# Patient Record
Sex: MALE | Race: OTHER | NOT HISPANIC OR LATINO
[De-identification: names, ages, dates, MRNs, and addresses within clinical notes are randomized per-mention and may not be internally consistent; named-entity substitution may affect disease eponyms.]

---

## 2020-05-13 ENCOUNTER — APPOINTMENT (OUTPATIENT)
Dept: GERIATRICS | Facility: CLINIC | Age: 60
End: 2020-05-13

## 2020-05-13 ENCOUNTER — APPOINTMENT (OUTPATIENT)
Dept: INTERNAL MEDICINE | Facility: CLINIC | Age: 60
End: 2020-05-13

## 2020-05-19 ENCOUNTER — APPOINTMENT (OUTPATIENT)
Dept: INTERNAL MEDICINE | Facility: CLINIC | Age: 60
End: 2020-05-19
Payer: COMMERCIAL

## 2020-05-19 VITALS — HEIGHT: 62 IN | WEIGHT: 177 LBS | BODY MASS INDEX: 32.57 KG/M2 | RESPIRATION RATE: 16 BRPM

## 2020-05-19 DIAGNOSIS — Z83.3 FAMILY HISTORY OF DIABETES MELLITUS: ICD-10-CM

## 2020-05-19 DIAGNOSIS — Z60.2 PROBLEMS RELATED TO LIVING ALONE: ICD-10-CM

## 2020-05-19 DIAGNOSIS — F17.200 NICOTINE DEPENDENCE, UNSPECIFIED, UNCOMPLICATED: ICD-10-CM

## 2020-05-19 DIAGNOSIS — Z82.61 FAMILY HISTORY OF ARTHRITIS: ICD-10-CM

## 2020-05-19 PROBLEM — Z11.4 SCREENING FOR HIV (HUMAN IMMUNODEFICIENCY VIRUS): Status: ACTIVE | Noted: 2020-05-19

## 2020-05-19 PROBLEM — Z20.828 CLOSE EXPOSURE TO COVID-19 VIRUS: Status: ACTIVE | Noted: 2020-05-19

## 2020-05-19 PROBLEM — Z11.59 NEED FOR HEPATITIS C SCREENING TEST: Status: ACTIVE | Noted: 2020-05-19

## 2020-05-19 PROCEDURE — 99406 BEHAV CHNG SMOKING 3-10 MIN: CPT | Mod: 95

## 2020-05-19 PROCEDURE — G0444 DEPRESSION SCREEN ANNUAL: CPT | Mod: 95

## 2020-05-19 PROCEDURE — 99205 OFFICE O/P NEW HI 60 MIN: CPT | Mod: 25,95

## 2020-05-19 SDOH — SOCIAL STABILITY - SOCIAL INSECURITY: PROBLEMS RELATED TO LIVING ALONE: Z60.2

## 2020-05-22 ENCOUNTER — APPOINTMENT (OUTPATIENT)
Dept: INTERNAL MEDICINE | Facility: CLINIC | Age: 60
End: 2020-05-22
Payer: COMMERCIAL

## 2020-05-22 ENCOUNTER — TRANSCRIPTION ENCOUNTER (OUTPATIENT)
Age: 60
End: 2020-05-22

## 2020-05-22 VITALS
TEMPERATURE: 97.9 F | HEART RATE: 64 BPM | WEIGHT: 175 LBS | SYSTOLIC BLOOD PRESSURE: 135 MMHG | HEIGHT: 62 IN | DIASTOLIC BLOOD PRESSURE: 84 MMHG | BODY MASS INDEX: 32.2 KG/M2 | OXYGEN SATURATION: 98 %

## 2020-05-22 DIAGNOSIS — Z23 ENCOUNTER FOR IMMUNIZATION: ICD-10-CM

## 2020-05-22 DIAGNOSIS — Z20.828 CONTACT WITH AND (SUSPECTED) EXPOSURE TO OTHER VIRAL COMMUNICABLE DISEASES: ICD-10-CM

## 2020-05-22 DIAGNOSIS — Z11.59 ENCOUNTER FOR SCREENING FOR OTHER VIRAL DISEASES: ICD-10-CM

## 2020-05-22 DIAGNOSIS — Z11.4 ENCOUNTER FOR SCREENING FOR HUMAN IMMUNODEFICIENCY VIRUS [HIV]: ICD-10-CM

## 2020-05-22 PROCEDURE — 99214 OFFICE O/P EST MOD 30 MIN: CPT | Mod: 25

## 2020-05-22 PROCEDURE — 90472 IMMUNIZATION ADMIN EACH ADD: CPT

## 2020-05-22 PROCEDURE — G0009: CPT

## 2020-05-22 PROCEDURE — 90732 PPSV23 VACC 2 YRS+ SUBQ/IM: CPT

## 2020-05-22 PROCEDURE — 90715 TDAP VACCINE 7 YRS/> IM: CPT

## 2020-05-22 PROCEDURE — 36415 COLL VENOUS BLD VENIPUNCTURE: CPT

## 2020-05-26 LAB
25(OH)D3 SERPL-MCNC: 15.3 NG/ML
ALBUMIN SERPL ELPH-MCNC: 4.2 G/DL
ALP BLD-CCNC: 85 U/L
ALT SERPL-CCNC: 19 U/L
ANACR T: NEGATIVE
ANION GAP SERPL CALC-SCNC: 15 MMOL/L
AST SERPL-CCNC: 22 U/L
BASOPHILS # BLD AUTO: 0.08 K/UL
BASOPHILS NFR BLD AUTO: 1.1 %
BILIRUB SERPL-MCNC: 0.4 MG/DL
BUN SERPL-MCNC: 15 MG/DL
CALCIUM SERPL-MCNC: 9.3 MG/DL
CCP AB SER IA-ACNC: <8 UNITS
CHLORIDE SERPL-SCNC: 104 MMOL/L
CHOLEST SERPL-MCNC: 149 MG/DL
CHOLEST/HDLC SERPL: 4.2 RATIO
CK SERPL-CCNC: 216 U/L
CO2 SERPL-SCNC: 21 MMOL/L
CREAT SERPL-MCNC: 0.97 MG/DL
CRP SERPL-MCNC: 1.67 MG/DL
EOSINOPHIL # BLD AUTO: 0.22 K/UL
EOSINOPHIL NFR BLD AUTO: 3 %
ERYTHROCYTE [SEDIMENTATION RATE] IN BLOOD BY WESTERGREN METHOD: 33 MM/HR
ESTIMATED AVERAGE GLUCOSE: 117 MG/DL
GLUCOSE SERPL-MCNC: 105 MG/DL
HBA1C MFR BLD HPLC: 5.7 %
HCT VFR BLD CALC: 43 %
HCV AB SER QL: NONREACTIVE
HCV S/CO RATIO: 0.18 S/CO
HDLC SERPL-MCNC: 35 MG/DL
HGB BLD-MCNC: 13 G/DL
HIV1+2 AB SPEC QL IA.RAPID: NONREACTIVE
IMM GRANULOCYTES NFR BLD AUTO: 0.7 %
LDLC SERPL CALC-MCNC: 92 MG/DL
LYMPHOCYTES # BLD AUTO: 1.5 K/UL
LYMPHOCYTES NFR BLD AUTO: 20.2 %
MAN DIFF?: NORMAL
MCHC RBC-ENTMCNC: 27.1 PG
MCHC RBC-ENTMCNC: 30.2 GM/DL
MCV RBC AUTO: 89.8 FL
MONOCYTES # BLD AUTO: 0.52 K/UL
MONOCYTES NFR BLD AUTO: 7 %
NEUTROPHILS # BLD AUTO: 5.05 K/UL
NEUTROPHILS NFR BLD AUTO: 68 %
PLATELET # BLD AUTO: 237 K/UL
POTASSIUM SERPL-SCNC: 4.3 MMOL/L
PROT SERPL-MCNC: 7 G/DL
RBC # BLD: 4.79 M/UL
RBC # FLD: 13.5 %
RF+CCP IGG SER-IMP: NEGATIVE
RHEUMATOID FACT SER QL: <10 IU/ML
SARS-COV-2 IGG SERPL IA-ACNC: 6.18 INDEX
SARS-COV-2 IGG SERPL QL IA: POSITIVE
SODIUM SERPL-SCNC: 140 MMOL/L
T4 FREE SERPL-MCNC: 0.9 NG/DL
TRIGL SERPL-MCNC: 107 MG/DL
TSH SERPL-ACNC: 3.03 UIU/ML
VIT B12 SERPL-MCNC: 629 PG/ML
WBC # FLD AUTO: 7.42 K/UL

## 2020-06-05 ENCOUNTER — RESULT REVIEW (OUTPATIENT)
Age: 60
End: 2020-06-05

## 2020-06-05 ENCOUNTER — APPOINTMENT (OUTPATIENT)
Dept: RADIOLOGY | Facility: CLINIC | Age: 60
End: 2020-06-05

## 2020-06-05 ENCOUNTER — LABORATORY RESULT (OUTPATIENT)
Age: 60
End: 2020-06-05

## 2020-06-05 ENCOUNTER — APPOINTMENT (OUTPATIENT)
Dept: RHEUMATOLOGY | Facility: CLINIC | Age: 60
End: 2020-06-05
Payer: COMMERCIAL

## 2020-06-05 ENCOUNTER — OUTPATIENT (OUTPATIENT)
Dept: OUTPATIENT SERVICES | Facility: HOSPITAL | Age: 60
LOS: 1 days | End: 2020-06-05
Payer: COMMERCIAL

## 2020-06-05 VITALS
WEIGHT: 167.56 LBS | HEIGHT: 62 IN | TEMPERATURE: 98.3 F | BODY MASS INDEX: 30.83 KG/M2 | HEART RATE: 70 BPM | SYSTOLIC BLOOD PRESSURE: 133 MMHG | DIASTOLIC BLOOD PRESSURE: 80 MMHG | OXYGEN SATURATION: 96 %

## 2020-06-05 DIAGNOSIS — Z00.00 ENCOUNTER FOR GENERAL ADULT MEDICAL EXAMINATION W/OUT ABNORMAL FINDINGS: ICD-10-CM

## 2020-06-05 DIAGNOSIS — Z12.83 ENCOUNTER FOR SCREENING FOR MALIGNANT NEOPLASM OF SKIN: ICD-10-CM

## 2020-06-05 DIAGNOSIS — M25.552 PAIN IN RIGHT HIP: ICD-10-CM

## 2020-06-05 DIAGNOSIS — R35.0 FREQUENCY OF MICTURITION: ICD-10-CM

## 2020-06-05 DIAGNOSIS — M25.551 PAIN IN RIGHT HIP: ICD-10-CM

## 2020-06-05 DIAGNOSIS — R63.4 ABNORMAL WEIGHT LOSS: ICD-10-CM

## 2020-06-05 PROCEDURE — 99205 OFFICE O/P NEW HI 60 MIN: CPT | Mod: 25

## 2020-06-05 PROCEDURE — 72170 X-RAY EXAM OF PELVIS: CPT | Mod: 26

## 2020-06-05 PROCEDURE — 73030 X-RAY EXAM OF SHOULDER: CPT | Mod: 26,50

## 2020-06-05 PROCEDURE — 36415 COLL VENOUS BLD VENIPUNCTURE: CPT

## 2020-06-05 NOTE — PHYSICAL EXAM
[General Appearance - Alert] : alert [General Appearance - In No Acute Distress] : in no acute distress [General Appearance - Well Nourished] : well nourished [General Appearance - Well Developed] : well developed [Sclera] : the sclera and conjunctiva were normal [Outer Ear] : the ears and nose were normal in appearance [Neck Appearance] : the appearance of the neck was normal [Neck Cervical Mass (___cm)] : no neck mass was observed [Respiration, Rhythm And Depth] : normal respiratory rhythm and effort [Auscultation Breath Sounds / Voice Sounds] : lungs were clear to auscultation bilaterally [Heart Rate And Rhythm] : heart rate was normal and rhythm regular [Heart Sounds] : normal S1 and S2 [Murmurs] : no murmurs [Heart Sounds Pericardial Friction Rub] : no pericardial rub [Edema] : there was no peripheral edema [Veins - Varicosity Changes] : there were no varicosital changes [Abdomen Tenderness] : non-tender [Cervical Lymph Nodes Enlarged Posterior Bilaterally] : posterior cervical [Cervical Lymph Nodes Enlarged Anterior Bilaterally] : anterior cervical [Supraclavicular Lymph Nodes Enlarged Bilaterally] : supraclavicular [Axillary Lymph Nodes Enlarged Bilaterally] : axillary [No Spinal Tenderness] : no spinal tenderness [Abnormal Walk] : normal gait [Nail Clubbing] : no clubbing  or cyanosis of the fingernails [Musculoskeletal - Swelling] : no joint swelling seen [Motor Tone] : muscle strength and tone were normal [] : no rash [Skin Lesions] : no skin lesions [Sensation] : the sensory exam was normal to light touch and pinprick [Motor Exam] : the motor exam was normal [Oriented To Time, Place, And Person] : oriented to person, place, and time [Impaired Insight] : insight and judgment were intact [Affect] : the affect was normal [FreeTextEntry1] : back with few moles

## 2020-06-05 NOTE — HISTORY OF PRESENT ILLNESS
[Weight Loss] : weight loss [Arthralgias] : arthralgias [Morning Stiffness] : morning stiffness [Difficulty Standing] : difficulty standing [Difficulty Walking] : difficulty walking [Myalgias] : myalgias [Muscle Weakness] : muscle weakness [Muscle Spasms] : muscle spasms [Muscle Cramping] : muscle cramping [FreeTextEntry1] : Referred by  for Rheumatology consultation \par \par \par 58 y/o cigar smoker 2 times per week ( 3-4 cigars) , pre DM, Vit D deficiency. \par Since early feb reports progressive  b/l groin and hip pain, first noticed on left side and then affecting R side. Subsequently left> right shoulder pain, neck stiffness \par AM stiffness for hours, symptoms improve as day goes on.  \par lifting arms above head has been difficult, has noticed trouble taking stairs. \par Has muscle ache and cramps. \par Denies constitutional symptoms such as fever, chills or night sweats. Denies HA or jaw claudication, or transient visual loss. \par Since pandemic lost weight after changing to healthy diet. 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 \par He took tylenol and Ibuprofen for pain control, which  has helped. \par Referred to r/u PMR. \par \par No h/o  memory loss, patchy hair loss, sicca symptoms, photosensitivity, HA,  Raynaud's, oral ulcers, nasal ulcers. \par Personal or family hx of autoimmune disease, no hx of psoriasis\par  [Anorexia] : no anorexia [Malaise] : no malaise [Fever] : no fever [Chills] : no chills [Fatigue] : no fatigue [Depression] : no depression [Malar Facial Rash] : no malar facial rash [Skin Lesions] : no lesions [Skin Nodules] : no skin nodules [Oral Ulcers] : no oral ulcers [Cough] : no cough [Dry Mouth] : no dry mouth [Dysphonia] : no dysphonia [Dysphagia] : no dysphagia [Shortness of Breath] : no shortness of breath [Chest Pain] : no chest pain [Joint Swelling] : no joint swelling [Joint Deformity] : no joint deformity [Decreased ROM] : no decreased range of motion [Falls] : no falls [Dyspnea] : no dyspnea [Visual Changes] : no visual changes [Eye Pain] : no eye pain [Eye Redness] : no eye redness [Dry Eyes] : no dry eyes

## 2020-06-05 NOTE — REVIEW OF SYSTEMS
[Recent Weight Loss (___ Lbs)] : recent [unfilled] ~Ulb weight loss [Feeling Tired] : feeling tired [Muscle Weakness] : muscle weakness [Fever] : no fever [Chills] : no chills [Feeling Poorly] : not feeling poorly [Eyesight Problems] : no eyesight problems [Dry Eyes] : no dryness of the eyes [Heart Rate Is Slow] : the heart rate was not slow [Chest Pain] : no chest pain [Palpitations] : no palpitations [Cough] : no cough [SOB on Exertion] : no shortness of breath during exertion [Constipation] : no constipation [Diarrhea] : no diarrhea [Anxiety] : no anxiety [Depression] : no depression [Easy Bleeding] : no tendency for easy bleeding [Easy Bruising] : no tendency for easy bruising [Swollen Glands] : no swollen glands [Swollen Glands In The Neck] : no swollen glands in the neck

## 2020-06-05 NOTE — ASSESSMENT
[FreeTextEntry1] : 60 y/o M with gradual onset first proximal lower extremity pain /muscle ache and lately upper extremity/shoulder symptoms with hours of AM stiffness, mildly elevated inflammatory markers ESR, CRP and . \par DDx of his presentation is likely PMR vs OA, I have low suspicious for inflammatory myositis ( has no other features such as typical  skin rash, proximal muscle weakness ) \par \par 1. Likely PMR: start prednisone trial 20mg for 2 weeks and monitor response \par Recommended to avoid NSAIDs while on prednisone to prevent synergistic side effects such as risk of GI bleeding. \par \par Corticosteroid side effect discussed: predisposition to infection, resulting in a dose-dependent increase in the risk of infection, especially with common bacterial, viral, and fungal pathogens\par Cushingoid features, weight gain \par Increased intraocular pressure and cataract \par Cardiovascular effects: hypertension and fluid retention, premature atherosclerosis.\par Gastrointestinal effects, such as gastritis, ulcer formation, and gastrointestinal bleeding. \par Bone and muscle effects: Osteoporosis, myopathy, osteonecrosis. \par Mood and sleep problems \par \par \par Obtain b/l hip and shoulder Xrays to rule out inflammatory vs DJD \par \par 2. Mild CK elevation: repeat CK, myositis specific labs and HMGCR\par 3. Age appropriate cancer screening: need skin check and colonoscopy, reports intentional weight loss \par will check PSA as pt iis chronic smoker and at risk for malignancy \par check SPEP/UPEP with IF. \par UA \par \par 60 Minutes face to face  encounter \par 50% of time spend on counselling and/or coordinating of care\par \par \par  \par \par \par \par \par

## 2020-06-09 LAB
ALBUMIN MFR SERPL ELPH: 53.2 %
ALBUMIN SERPL-MCNC: 4.4 G/DL
ALBUMIN/GLOB SERPL: 1.2 RATIO
ALDOLASE SERPL-CCNC: 5 U/L
ALPHA1 GLOB MFR SERPL ELPH: 4.9 %
ALPHA1 GLOB SERPL ELPH-MCNC: 0.4 G/DL
ALPHA2 GLOB MFR SERPL ELPH: 8.8 %
ALPHA2 GLOB SERPL ELPH-MCNC: 0.7 G/DL
APPEARANCE: CLEAR
APPEARANCE: CLEAR
B-GLOBULIN MFR SERPL ELPH: 12 %
B-GLOBULIN SERPL ELPH-MCNC: 1 G/DL
BACTERIA: NEGATIVE
BILIRUBIN URINE: NEGATIVE
BILIRUBIN URINE: NEGATIVE
BLOOD URINE: NEGATIVE
BLOOD URINE: NEGATIVE
CK SERPL-CCNC: 224 U/L
COLOR: NORMAL
COLOR: NORMAL
DEPRECATED KAPPA LC FREE/LAMBDA SER: 1.33 RATIO
GAMMA GLOB FLD ELPH-MCNC: 1.7 G/DL
GAMMA GLOB MFR SERPL ELPH: 21.1 %
GLUCOSE QUALITATIVE U: NEGATIVE
GLUCOSE QUALITATIVE U: NEGATIVE
HYALINE CASTS: 1 /LPF
IGA SER QL IEP: 333 MG/DL
IGG SER QL IEP: 1810 MG/DL
IGM SER QL IEP: 64 MG/DL
INTERPRETATION SERPL IEP-IMP: NORMAL
KAPPA LC CSF-MCNC: 1.49 MG/DL
KAPPA LC SERPL-MCNC: 1.98 MG/DL
KETONES URINE: NEGATIVE
KETONES URINE: NEGATIVE
LEUKOCYTE ESTERASE URINE: NEGATIVE
LEUKOCYTE ESTERASE URINE: NEGATIVE
M PROTEIN SPEC IFE-MCNC: NORMAL
MICROSCOPIC-UA: NORMAL
NITRITE URINE: NEGATIVE
NITRITE URINE: NEGATIVE
PH URINE: 6.5
PH URINE: 6.5
PROT SERPL-MCNC: 8.2 G/DL
PROT SERPL-MCNC: 8.2 G/DL
PROTEIN URINE: NORMAL
PROTEIN URINE: NORMAL
PSA SERPL-MCNC: 0.87 NG/ML
RED BLOOD CELLS URINE: 1 /HPF
SPECIFIC GRAVITY URINE: 1.02
SPECIFIC GRAVITY URINE: 1.02
SQUAMOUS EPITHELIAL CELLS: 1 /HPF
UROBILINOGEN URINE: NORMAL
UROBILINOGEN URINE: NORMAL
WHITE BLOOD CELLS URINE: 1 /HPF

## 2020-06-10 LAB
ALBUPE: 18.3 %
ALPHA1UPE: 37.1 %
ALPHA2UPE: 20.3 %
BETAUPE: 11.6 %
GAMMAUPE: 12.7 %
IGA 24H UR QL IFE: NORMAL
KAPPA LC 24H UR QL: NORMAL
PROT PATTERN 24H UR ELPH-IMP: NORMAL
PROT UR-MCNC: 11 MG/DL
PROT UR-MCNC: 11 MG/DL

## 2020-06-12 LAB — HMGCR ANTIBODY, IGG: <3 UNITS

## 2020-06-19 ENCOUNTER — APPOINTMENT (OUTPATIENT)
Dept: RADIOLOGY | Facility: CLINIC | Age: 60
End: 2020-06-19
Payer: COMMERCIAL

## 2020-06-19 ENCOUNTER — OUTPATIENT (OUTPATIENT)
Dept: OUTPATIENT SERVICES | Facility: HOSPITAL | Age: 60
LOS: 1 days | End: 2020-06-19

## 2020-06-19 ENCOUNTER — APPOINTMENT (OUTPATIENT)
Dept: RHEUMATOLOGY | Facility: CLINIC | Age: 60
End: 2020-06-19
Payer: COMMERCIAL

## 2020-06-19 VITALS
HEART RATE: 65 BPM | SYSTOLIC BLOOD PRESSURE: 135 MMHG | HEIGHT: 62 IN | DIASTOLIC BLOOD PRESSURE: 77 MMHG | OXYGEN SATURATION: 97 % | TEMPERATURE: 98.2 F | WEIGHT: 170 LBS | BODY MASS INDEX: 31.28 KG/M2

## 2020-06-19 DIAGNOSIS — S22.000A WEDGE COMPRESSION FRACTURE OF UNSPECIFIED THORACIC VERTEBRA, INITIAL ENCOUNTER FOR CLOSED FRACTURE: ICD-10-CM

## 2020-06-19 DIAGNOSIS — R74.8 ABNORMAL LEVELS OF OTHER SERUM ENZYMES: ICD-10-CM

## 2020-06-19 PROCEDURE — 99214 OFFICE O/P EST MOD 30 MIN: CPT | Mod: 25

## 2020-06-19 PROCEDURE — 77085 DXA BONE DENSITY AXL VRT FX: CPT | Mod: 26

## 2020-06-19 PROCEDURE — 36415 COLL VENOUS BLD VENIPUNCTURE: CPT

## 2020-06-19 NOTE — PHYSICAL EXAM
[General Appearance - In No Acute Distress] : in no acute distress [General Appearance - Alert] : alert [General Appearance - Well Developed] : well developed [General Appearance - Well Nourished] : well nourished [Sclera] : the sclera and conjunctiva were normal [Auscultation Breath Sounds / Voice Sounds] : lungs were clear to auscultation bilaterally [Respiration, Rhythm And Depth] : normal respiratory rhythm and effort [Heart Rate And Rhythm] : heart rate was normal and rhythm regular [Heart Sounds Pericardial Friction Rub] : no pericardial rub [Heart Sounds] : normal S1 and S2 [Murmurs] : no murmurs [Edema] : there was no peripheral edema [Veins - Varicosity Changes] : there were no varicosital changes [Cervical Lymph Nodes Enlarged Posterior Bilaterally] : posterior cervical [Abdomen Tenderness] : non-tender [Cervical Lymph Nodes Enlarged Anterior Bilaterally] : anterior cervical [Supraclavicular Lymph Nodes Enlarged Bilaterally] : supraclavicular [Axillary Lymph Nodes Enlarged Bilaterally] : axillary [Musculoskeletal - Swelling] : no joint swelling seen [Nail Clubbing] : no clubbing  or cyanosis of the fingernails [Abnormal Walk] : normal gait [Motor Tone] : muscle strength and tone were normal [] : no rash [Skin Lesions] : no skin lesions [Oriented To Time, Place, And Person] : oriented to person, place, and time [Impaired Insight] : insight and judgment were intact [Affect] : the affect was normal [FreeTextEntry1] : back with few moles

## 2020-06-19 NOTE — ASSESSMENT
[FreeTextEntry1] : 60 y/o M with gradual onset first proximal lower extremity pain /muscle ache and lately upper extremity/shoulder symptoms with hours of AM stiffness, mildly elevated inflammatory markers ESR, CRP and . \par Diagnosed PMR and started prednisone 20mg with great clinica response. 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 Productive changes in the spine with mid thoracic compression deformity of indeterminate age. \par Cam morphology of the femora. \par  I have low suspicious for inflammatory myositis ( has no other features such as typical  skin rash, proximal muscle weakness ) and has myositis specific labs all negative \par \par \par 1. PMR:  excellent clinical response to  20mg of prednisone for 2 weeks, recommended to complete 3 weeks of curent regimen and the she can taper down to 15mg X3 weeks and then 12.5mg X3 weeks \par Check ESR and CRP today \par \par Corticosteroid side effect discussed: predisposition to infection, resulting in a dose-dependent increase in the risk of infection, especially with common bacterial, viral, and fungal pathogens\par Cushingoid features, weight gain \par Increased intraocular pressure and cataract \par Cardiovascular effects: hypertension and fluid retention, premature atherosclerosis.\par Gastrointestinal effects, such as gastritis, ulcer formation, and gastrointestinal bleeding. \par Bone and muscle effects: Osteoporosis, myopathy, osteonecrosis. 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 Mild CK elevation:non specific  myositis specific labs and HMGCR, pt is very active and exercise regularly. \par 3. Age appropriate cancer screening: need skin check and colonoscopy, has normal PSA, SPEP/UPEP\par 4. Thoracic compression deformity:  incidentally found, will obtain DXA and OP labs \par Bone health: \par c/w calcium and Vit D\par \par f/u in 6 weeks \par \par 25  Minutes face to face  encounter \par 50% of time spend on counselling and/or coordinating of care\par \par \par  \par \par \par \par \par

## 2020-06-19 NOTE — REVIEW OF SYSTEMS
[Recent Weight Loss (___ Lbs)] : recent [unfilled] ~Ulb weight loss [Muscle Weakness] : muscle weakness [Fever] : no fever [Chills] : no chills [Feeling Poorly] : not feeling poorly [Feeling Tired] : not feeling tired [Heart Rate Is Slow] : the heart rate was not slow [Dry Eyes] : no dryness of the eyes [Eyesight Problems] : no eyesight problems [Chest Pain] : no chest pain [Palpitations] : no palpitations [Cough] : no cough [SOB on Exertion] : no shortness of breath during exertion [Constipation] : no constipation [Diarrhea] : no diarrhea [Anxiety] : no anxiety [Depression] : no depression [Easy Bruising] : no tendency for easy bruising [Easy Bleeding] : no tendency for easy bleeding [Swollen Glands] : no swollen glands [Swollen Glands In The Neck] : no swollen glands in the neck

## 2020-06-19 NOTE — HISTORY OF PRESENT ILLNESS
[Weight Loss] : weight loss [___ Week(s) Ago] : [unfilled] week(s) ago [FreeTextEntry1] : Follow up: 6/19/20 \par PMR: on prednisone 20mg for past 2 weeks, has excellent response. All symptoms resolved. He is back to baseline. \par Regained 4Ibs \par Labs and Xrays reviewed with pt. \par Noted th spine compression deformity \par \par \par \par Referred by  for Rheumatology consultation \par \par \par 58 y/o cigar smoker 2 times per week ( 3-4 cigars) , pre DM, Vit D deficiency. \par Since early feb reports progressive  b/l groin and hip pain, first noticed on left side and then affecting R side. Subsequently left> right shoulder pain, neck stiffness \par AM stiffness for hours, symptoms improve as day goes on.  \par lifting arms above head has been difficult, has noticed trouble taking stairs. \par Has muscle ache and cramps. \par Denies constitutional symptoms such as fever, chills or night sweats. Denies HA or jaw claudication, or transient visual loss. \par Since pandemic lost weight after changing to healthy diet. 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 \par He took tylenol and Ibuprofen for pain control, which  has helped. \par Referred to r/u PMR. \par \par No h/o  memory loss, patchy hair loss, sicca symptoms, photosensitivity, HA,  Raynaud's, oral ulcers, nasal ulcers. \par Personal or family hx of autoimmune disease, no hx of psoriasis\par  [Anorexia] : no anorexia [Malaise] : no malaise [Fever] : no fever [Depression] : no depression [Chills] : no chills [Fatigue] : no fatigue [Malar Facial Rash] : no malar facial rash [Skin Lesions] : no lesions [Oral Ulcers] : no oral ulcers [Skin Nodules] : no skin nodules [Cough] : no cough [Dry Mouth] : no dry mouth [Shortness of Breath] : no shortness of breath [Dysphonia] : no dysphonia [Dysphagia] : no dysphagia [Arthralgias] : no arthralgias [Chest Pain] : no chest pain [Joint Deformity] : no joint deformity [Joint Swelling] : no joint swelling [Decreased ROM] : no decreased range of motion [Morning Stiffness] : no morning stiffness [Falls] : no falls [Difficulty Walking] : no difficulty walking [Difficulty Standing] : no difficulty standing [Dyspnea] : no dyspnea [Myalgias] : no myalgias [Muscle Spasms] : no muscle spasms [Muscle Weakness] : no muscle weakness [Muscle Cramping] : no muscle cramping [Visual Changes] : no visual changes [Eye Pain] : no eye pain [Eye Redness] : no eye redness [Dry Eyes] : no dry eyes

## 2020-06-20 LAB
CALCIUM SERPL-MCNC: 9.8 MG/DL
CRP SERPL-MCNC: 0.13 MG/DL
ERYTHROCYTE [SEDIMENTATION RATE] IN BLOOD BY WESTERGREN METHOD: 12 MM/HR
MAGNESIUM SERPL-MCNC: 2.2 MG/DL
PARATHYROID HORMONE INTACT: 29 PG/ML
PHOSPHATE SERPL-MCNC: 3.1 MG/DL

## 2020-06-25 ENCOUNTER — TRANSCRIPTION ENCOUNTER (OUTPATIENT)
Age: 60
End: 2020-06-25

## 2020-06-26 LAB
TESTOST BND SERPL-MCNC: 2.7 PG/ML
TESTOST SERPL-MCNC: 218.1 NG/DL

## 2020-06-28 ENCOUNTER — RX RENEWAL (OUTPATIENT)
Age: 60
End: 2020-06-28

## 2020-07-14 LAB — ENA JO1 AB SER IA-ACNC: NORMAL

## 2020-07-31 ENCOUNTER — APPOINTMENT (OUTPATIENT)
Dept: RHEUMATOLOGY | Facility: CLINIC | Age: 60
End: 2020-07-31
Payer: COMMERCIAL

## 2020-07-31 VITALS
DIASTOLIC BLOOD PRESSURE: 82 MMHG | HEART RATE: 65 BPM | SYSTOLIC BLOOD PRESSURE: 142 MMHG | TEMPERATURE: 98.2 F | WEIGHT: 171 LBS | HEIGHT: 62 IN | BODY MASS INDEX: 31.47 KG/M2 | OXYGEN SATURATION: 96 %

## 2020-07-31 DIAGNOSIS — R35.0 FREQUENCY OF MICTURITION: ICD-10-CM

## 2020-07-31 DIAGNOSIS — M19.019 PRIMARY OSTEOARTHRITIS, UNSPECIFIED SHOULDER: ICD-10-CM

## 2020-07-31 PROCEDURE — 99214 OFFICE O/P EST MOD 30 MIN: CPT

## 2020-07-31 NOTE — ASSESSMENT
[FreeTextEntry1] : 58 y/o M with gradual onset first proximal lower extremity pain /muscle ache and lately upper extremity/shoulder symptoms with hours of AM stiffness, mildly elevated inflammatory markers ESR, CRP and . \par Diagnosed PMR and started prednisone 20mg with great clinical response. 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 Productive changes in the spine with mid thoracic compression deformity of indeterminate age. \par Cam morphology of the femora. \par \par \par 1. PMR:  excellent clinical response to  20mg of prednisone, subsequently tapered down and currently takes  12.5mg X2 weeks, instructed c/w 12.5mg for X3 weeks, then taper down to 10mg X3 weeks ( end of August) and subsequently if stable symptoms can go to 7.5mg until next follow up.  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 Mild CK elevation:non specific  myositis specific labs and HMGCR, pt is very active and exercise regularly. \par 3. Age appropriate cancer screening: need skin check and colonoscopy, has normal PSA, SPEP/UPEP\par 4. DXA, osteopenia,  Thoracic compression deformity without fracture, \par we talked about bone health \par Calcium 1200 mg daily from diet and supplements (to be taken in divided doses as no more than 500-600 mg can be absorbed at one time)\par Continue current vitamin D regimen 2000 and will add high Ergocalciferol 50.000IU for next 3 months as Vit D 15. \par Diet, exercise and fall prevention discussed. \par Has low Testosterone level: herve refer to Urology \par \par 5. Urinary frequency: check UA \par \par f/u in 6 weeks \par \par 25  Minutes face to face  encounter \par 50% of time spend on counselling and/or coordinating of care\par \par \par  \par \par \par \par \par

## 2020-07-31 NOTE — PHYSICAL EXAM
[General Appearance - Alert] : alert [General Appearance - In No Acute Distress] : in no acute distress [General Appearance - Well Nourished] : well nourished [General Appearance - Well Developed] : well developed [Sclera] : the sclera and conjunctiva were normal [Auscultation Breath Sounds / Voice Sounds] : lungs were clear to auscultation bilaterally [Respiration, Rhythm And Depth] : normal respiratory rhythm and effort [Heart Sounds] : normal S1 and S2 [Heart Rate And Rhythm] : heart rate was normal and rhythm regular [Murmurs] : no murmurs [Edema] : there was no peripheral edema [Heart Sounds Pericardial Friction Rub] : no pericardial rub [Veins - Varicosity Changes] : there were no varicosital changes [Abdomen Tenderness] : non-tender [Cervical Lymph Nodes Enlarged Anterior Bilaterally] : anterior cervical [Supraclavicular Lymph Nodes Enlarged Bilaterally] : supraclavicular [Cervical Lymph Nodes Enlarged Posterior Bilaterally] : posterior cervical [Axillary Lymph Nodes Enlarged Bilaterally] : axillary [Abnormal Walk] : normal gait [Nail Clubbing] : no clubbing  or cyanosis of the fingernails [Musculoskeletal - Swelling] : no joint swelling seen [Motor Tone] : muscle strength and tone were normal [] : no rash [Skin Lesions] : no skin lesions [Oriented To Time, Place, And Person] : oriented to person, place, and time [Impaired Insight] : insight and judgment were intact [Affect] : the affect was normal [FreeTextEntry1] : back with few moles

## 2020-07-31 NOTE — HISTORY OF PRESENT ILLNESS
[___ Week(s) Ago] : [unfilled] week(s) ago [Weight Loss] : weight loss [FreeTextEntry1] : Follow up: 7/31/20 \par 60 y/o M with PMR \par started  prednisone on 06/5/20  20mg with excellent clinical n response. All symptoms resolved. He is back to baseline. \par prednisone tapered down and taking 12.5mg for past 2 week. \par Regained 4Ibs \par Osteopenia: takes Calcium 1200mg and Vit D 2000, low Vit D \par \par \par Follow up: 6/19/20 \par PMR: on prednisone 20mg for past 2 weeks, has excellent response. All symptoms resolved. He is back to baseline. \par Regained 4Ibs \par Labs and Xrays reviewed with pt. \par Noted th spine compression deformity \par \par \par \par Referred by  for Rheumatology consultation \par \par \par 60 y/o cigar smoker 2 times per week ( 3-4 cigars) , pre DM, Vit D deficiency. \par Since early feb reports progressive  b/l groin and hip pain, first noticed on left side and then affecting R side. Subsequently left> right shoulder pain, neck stiffness \par AM stiffness for hours, symptoms improve as day goes on.  \par lifting arms above head has been difficult, has noticed trouble taking stairs. \par Has muscle ache and cramps. \par Denies constitutional symptoms such as fever, chills or night sweats. Denies HA or jaw claudication, or transient visual loss. \par Since pandemic lost weight after changing to healthy diet. 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 \par He took tylenol and Ibuprofen for pain control, which  has helped. \par Referred to r/u PMR. \par \par No h/o  memory loss, patchy hair loss, sicca symptoms, photosensitivity, HA,  Raynaud's, oral ulcers, nasal ulcers. \par Personal or family hx of autoimmune disease, no hx of psoriasis\par  [Malaise] : no malaise [Anorexia] : no anorexia [Fever] : no fever [Chills] : no chills [Fatigue] : no fatigue [Depression] : no depression [Malar Facial Rash] : no malar facial rash [Skin Lesions] : no lesions [Oral Ulcers] : no oral ulcers [Skin Nodules] : no skin nodules [Cough] : no cough [Dysphonia] : no dysphonia [Dry Mouth] : no dry mouth [Shortness of Breath] : no shortness of breath [Dysphagia] : no dysphagia [Arthralgias] : no arthralgias [Chest Pain] : no chest pain [Joint Swelling] : no joint swelling [Joint Deformity] : no joint deformity [Decreased ROM] : no decreased range of motion [Morning Stiffness] : no morning stiffness [Falls] : no falls [Difficulty Standing] : no difficulty standing [Dyspnea] : no dyspnea [Difficulty Walking] : no difficulty walking [Muscle Spasms] : no muscle spasms [Muscle Weakness] : no muscle weakness [Myalgias] : no myalgias [Muscle Cramping] : no muscle cramping [Visual Changes] : no visual changes [Eye Pain] : no eye pain [Eye Redness] : no eye redness [Dry Eyes] : no dry eyes

## 2020-07-31 NOTE — REVIEW OF SYSTEMS
[Recent Weight Loss (___ Lbs)] : recent [unfilled] ~Ulb weight loss [Muscle Weakness] : muscle weakness [Chills] : no chills [Fever] : no fever [Feeling Poorly] : not feeling poorly [Feeling Tired] : not feeling tired [Dry Eyes] : no dryness of the eyes [Eyesight Problems] : no eyesight problems [Heart Rate Is Slow] : the heart rate was not slow [Chest Pain] : no chest pain [Palpitations] : no palpitations [Cough] : no cough [SOB on Exertion] : no shortness of breath during exertion [Constipation] : no constipation [Diarrhea] : no diarrhea [Anxiety] : no anxiety [Depression] : no depression [Easy Bleeding] : no tendency for easy bleeding [Easy Bruising] : no tendency for easy bruising [Swollen Glands] : no swollen glands [Swollen Glands In The Neck] : no swollen glands in the neck

## 2020-08-02 LAB
APPEARANCE: CLEAR
BILIRUBIN URINE: NEGATIVE
BLOOD URINE: NEGATIVE
COLOR: NORMAL
GLUCOSE QUALITATIVE U: NEGATIVE
KETONES URINE: NEGATIVE
LEUKOCYTE ESTERASE URINE: NEGATIVE
NITRITE URINE: NEGATIVE
PH URINE: 6
PROTEIN URINE: NEGATIVE
SPECIFIC GRAVITY URINE: 1.02
UROBILINOGEN URINE: NORMAL

## 2020-08-17 ENCOUNTER — RX RENEWAL (OUTPATIENT)
Age: 60
End: 2020-08-17

## 2020-09-11 ENCOUNTER — APPOINTMENT (OUTPATIENT)
Dept: RHEUMATOLOGY | Facility: CLINIC | Age: 60
End: 2020-09-11
Payer: COMMERCIAL

## 2020-09-11 VITALS
OXYGEN SATURATION: 96 % | DIASTOLIC BLOOD PRESSURE: 78 MMHG | HEART RATE: 77 BPM | HEIGHT: 62 IN | WEIGHT: 175.06 LBS | TEMPERATURE: 98.1 F | BODY MASS INDEX: 32.22 KG/M2 | SYSTOLIC BLOOD PRESSURE: 141 MMHG

## 2020-09-11 PROCEDURE — 99214 OFFICE O/P EST MOD 30 MIN: CPT

## 2020-09-11 NOTE — HISTORY OF PRESENT ILLNESS
[___ Week(s) Ago] : [unfilled] week(s) ago [Weight Loss] : weight loss [FreeTextEntry1] : Follow up: 9/11/20 \par 58 y/o M with PMR \par started  prednisone on 06/5/20  20mg with excellent clinical  response. All symptoms resolved. He is back to baseline. \par prednisone tapered down and taking  7.5mg for past 10 days\par Regained 4Ibs \par Osteopenia: takes Calcium 1200mg and Vit D 50.000 weekly \par \par \par \par Follow up: 7/31/20 \par 58 y/o M with PMR \par started  prednisone on 06/5/20  20mg with excellent clinical n response. All symptoms resolved. He is back to baseline. \par prednisone tapered down and taking 12.5mg for past 2 week. \par Regained 4Ibs \par Osteopenia: takes Calcium 1200mg and Vit D 2000, low Vit D \par \par \par Follow up: 6/19/20 \par PMR: on prednisone 20mg for past 2 weeks, has excellent response. All symptoms resolved. He is back to baseline. \par Regained 4Ibs \par Labs and Xrays reviewed with pt. \par Noted th spine compression deformity \par \par \par \par Referred by  for Rheumatology consultation \par \par \par 58 y/o cigar smoker 2 times per week ( 3-4 cigars) , pre DM, Vit D deficiency. \par Since early feb reports progressive  b/l groin and hip pain, first noticed on left side and then affecting R side. Subsequently left> right shoulder pain, neck stiffness \par AM stiffness for hours, symptoms improve as day goes on.  \par lifting arms above head has been difficult, has noticed trouble taking stairs. \par Has muscle ache and cramps. \par Denies constitutional symptoms such as fever, chills or night sweats. Denies HA or jaw claudication, or transient visual loss. \par Since pandemic lost weight after changing to healthy diet. 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 \par He took tylenol and Ibuprofen for pain control, which  has helped. \par Referred to r/u PMR. \par \par No h/o  memory loss, patchy hair loss, sicca symptoms, photosensitivity, HA,  Raynaud's, oral ulcers, nasal ulcers. \par Personal or family hx of autoimmune disease, no hx of psoriasis\par  [Anorexia] : no anorexia [Malaise] : no malaise [Fever] : no fever [Fatigue] : no fatigue [Chills] : no chills [Depression] : no depression [Malar Facial Rash] : no malar facial rash [Oral Ulcers] : no oral ulcers [Skin Nodules] : no skin nodules [Skin Lesions] : no lesions [Cough] : no cough [Dry Mouth] : no dry mouth [Dysphonia] : no dysphonia [Dysphagia] : no dysphagia [Shortness of Breath] : no shortness of breath [Joint Swelling] : no joint swelling [Chest Pain] : no chest pain [Arthralgias] : no arthralgias [Decreased ROM] : no decreased range of motion [Joint Deformity] : no joint deformity [Difficulty Standing] : no difficulty standing [Morning Stiffness] : no morning stiffness [Falls] : no falls [Dyspnea] : no dyspnea [Difficulty Walking] : no difficulty walking [Muscle Spasms] : no muscle spasms [Myalgias] : no myalgias [Muscle Weakness] : no muscle weakness [Visual Changes] : no visual changes [Muscle Cramping] : no muscle cramping [Eye Redness] : no eye redness [Eye Pain] : no eye pain [Dry Eyes] : no dry eyes

## 2020-09-11 NOTE — REVIEW OF SYSTEMS
[Recent Weight Loss (___ Lbs)] : recent [unfilled] ~Ulb weight loss [Muscle Weakness] : muscle weakness [Fever] : no fever [Chills] : no chills [Feeling Poorly] : not feeling poorly [Feeling Tired] : not feeling tired [Dry Eyes] : no dryness of the eyes [Eyesight Problems] : no eyesight problems [Heart Rate Is Slow] : the heart rate was not slow [Chest Pain] : no chest pain [Cough] : no cough [SOB on Exertion] : no shortness of breath during exertion [Palpitations] : no palpitations [Constipation] : no constipation [Diarrhea] : no diarrhea [Easy Bleeding] : no tendency for easy bleeding [Depression] : no depression [Anxiety] : no anxiety [Easy Bruising] : no tendency for easy bruising [Swollen Glands] : no swollen glands [Swollen Glands In The Neck] : no swollen glands in the neck

## 2020-09-11 NOTE — ASSESSMENT
[FreeTextEntry1] : 58 y/o M with gradual onset first proximal lower extremity pain /muscle ache and lately upper extremity/shoulder symptoms with hours of AM stiffness, mildly elevated inflammatory markers ESR, CRP and . \par Diagnosed PMR and started prednisone 20mg 06/5/20 with great clinical response. 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 Productive changes in the spine with mid thoracic compression deformity of indeterminate age. \par Cam morphology of the femora. \par \par \par 1. PMR:  excellent clinical response to  20mg of prednisone ( started on 6/5/20) subsequently tapered down and currently takes  7.5mg  instructed c/w 7.5mg to complete 3 weeks, then taper down by 1 mg every 3 weeks. \par All above instruction written down and handed to pt. \par All prednisone refills for next few months has been renewed. 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 Mild CK elevation:non specific  myositis specific labs and HMGCR, pt is very active and exercise regularly. \par 3. Age appropriate cancer screening: need skin check and colonoscopy, has normal PSA, SPEP/UPEP\par 4. DXA, osteopenia,  Thoracic compression deformity without fracture, \par c/w \par Calcium 1200 mg daily from diet and supplements (to be taken in divided doses as no more than 500-600 mg can be absorbed at one time)\par Continue  high Ergocalciferol 50.000IU for next 3 months as Vit D 15. \par Diet, exercise and fall prevention discussed. \par Has low Testosterone level: not interested to se Urology\par \par f/u in 6 months \par \par 25  Minutes face to face  encounter \par 50% of time spend on counselling and/or coordinating of care\par \par \par  \par \par \par \par \par

## 2020-09-11 NOTE — PHYSICAL EXAM
[General Appearance - Alert] : alert [General Appearance - In No Acute Distress] : in no acute distress [General Appearance - Well Nourished] : well nourished [General Appearance - Well Developed] : well developed [Sclera] : the sclera and conjunctiva were normal [Respiration, Rhythm And Depth] : normal respiratory rhythm and effort [Auscultation Breath Sounds / Voice Sounds] : lungs were clear to auscultation bilaterally [Heart Rate And Rhythm] : heart rate was normal and rhythm regular [Heart Sounds] : normal S1 and S2 [Murmurs] : no murmurs [Heart Sounds Pericardial Friction Rub] : no pericardial rub [Edema] : there was no peripheral edema [Veins - Varicosity Changes] : there were no varicosital changes [Abdomen Tenderness] : non-tender [Cervical Lymph Nodes Enlarged Anterior Bilaterally] : anterior cervical [Cervical Lymph Nodes Enlarged Posterior Bilaterally] : posterior cervical [Supraclavicular Lymph Nodes Enlarged Bilaterally] : supraclavicular [Axillary Lymph Nodes Enlarged Bilaterally] : axillary [Nail Clubbing] : no clubbing  or cyanosis of the fingernails [Abnormal Walk] : normal gait [Musculoskeletal - Swelling] : no joint swelling seen [Motor Tone] : muscle strength and tone were normal [] : no rash [Skin Lesions] : no skin lesions [Affect] : the affect was normal [Impaired Insight] : insight and judgment were intact [Oriented To Time, Place, And Person] : oriented to person, place, and time [FreeTextEntry1] : back with few moles

## 2020-09-18 ENCOUNTER — RX RENEWAL (OUTPATIENT)
Age: 60
End: 2020-09-18

## 2020-12-16 ENCOUNTER — APPOINTMENT (OUTPATIENT)
Dept: RHEUMATOLOGY | Facility: CLINIC | Age: 60
End: 2020-12-16
Payer: COMMERCIAL

## 2020-12-16 PROCEDURE — 99442: CPT

## 2020-12-17 NOTE — ASSESSMENT
[FreeTextEntry1] : 60 year old man with gradual onset first proximal lower extremity pain /muscle ache and lately upper extremity/shoulder symptoms with hours of AM stiffness, mildly elevated inflammatory markers ESR, CRP and , diagnosed with PMR and started prednisone 20 mg 06/5/20 with great clinical response, currently on prednisone 3 mg qday as continues to taper.  Patient with increasing shoulder stiffness and pain, discussed possible increase in prednisone dose to 4 or 5 mg qday and then slowly continue to taper again.  Patient would like to hold off on increasing prednisone dose at this time and will continue current dose of 3 mg qday.  Patient will contact office if symptoms change or worsen.\par

## 2020-12-17 NOTE — HISTORY OF PRESENT ILLNESS
[FreeTextEntry1] : December 16, 2020\par Patient seen via telehealth visit for followup.  Discussed with patient.  You have chosen to receive care through the use of tele-media.  Telemedia enables health care providers at different locations to provide safe, effective, and convenient care through the use of technology.  Please note this is a billable encounter.  Patient understands that I cannot perform a physical exam and that patient may need to come to the clinic to complete the assessment.  Patient agreed verbally to to understanding the risks of benefits of telemedia as explained.\par \par Patient with history of PMR, currently on steroid taper. Patient currently on prednisone 3 mg qday.  Has felt some increase in pain in the shoulders and legs as tapered.  Tapered from 7.5 to 5 to 4 to 3 mg per day.  Left shoulder pain of the past couple of days.\par No headache, no jaw pain.\par Otherwise feeling well. \par \par

## 2020-12-22 ENCOUNTER — RX RENEWAL (OUTPATIENT)
Age: 60
End: 2020-12-22

## 2021-03-12 ENCOUNTER — APPOINTMENT (OUTPATIENT)
Dept: RHEUMATOLOGY | Facility: CLINIC | Age: 61
End: 2021-03-12

## 2021-03-19 ENCOUNTER — RESULT REVIEW (OUTPATIENT)
Age: 61
End: 2021-03-19

## 2021-03-19 ENCOUNTER — OUTPATIENT (OUTPATIENT)
Dept: OUTPATIENT SERVICES | Facility: HOSPITAL | Age: 61
LOS: 1 days | End: 2021-03-19
Payer: COMMERCIAL

## 2021-03-19 ENCOUNTER — APPOINTMENT (OUTPATIENT)
Dept: RADIOLOGY | Facility: CLINIC | Age: 61
End: 2021-03-19

## 2021-03-19 ENCOUNTER — APPOINTMENT (OUTPATIENT)
Dept: RHEUMATOLOGY | Facility: CLINIC | Age: 61
End: 2021-03-19
Payer: COMMERCIAL

## 2021-03-19 VITALS
TEMPERATURE: 97.7 F | BODY MASS INDEX: 32.39 KG/M2 | HEART RATE: 63 BPM | SYSTOLIC BLOOD PRESSURE: 145 MMHG | DIASTOLIC BLOOD PRESSURE: 87 MMHG | HEIGHT: 62 IN | WEIGHT: 176 LBS | OXYGEN SATURATION: 97 %

## 2021-03-19 DIAGNOSIS — M25.50 PAIN IN UNSPECIFIED JOINT: ICD-10-CM

## 2021-03-19 DIAGNOSIS — M25.532 PAIN IN RIGHT WRIST: ICD-10-CM

## 2021-03-19 DIAGNOSIS — M79.641 PAIN IN RIGHT HAND: ICD-10-CM

## 2021-03-19 DIAGNOSIS — M79.642 PAIN IN RIGHT HAND: ICD-10-CM

## 2021-03-19 DIAGNOSIS — M25.531 PAIN IN RIGHT WRIST: ICD-10-CM

## 2021-03-19 PROCEDURE — 73120 X-RAY EXAM OF HAND: CPT | Mod: 26,50

## 2021-03-19 PROCEDURE — 99214 OFFICE O/P EST MOD 30 MIN: CPT | Mod: 25

## 2021-03-19 PROCEDURE — 36415 COLL VENOUS BLD VENIPUNCTURE: CPT

## 2021-03-19 PROCEDURE — 99072 ADDL SUPL MATRL&STAF TM PHE: CPT

## 2021-03-19 NOTE — HISTORY OF PRESENT ILLNESS
[FreeTextEntry1] : December 16, 2020\par Patient seen via telehealth visit for followup.  Discussed with patient.  You have chosen to receive care through the use of tele-media.  Telemedia enables health care providers at different locations to provide safe, effective, and convenient care through the use of technology.  Please note this is a billable encounter.  Patient understands that I cannot perform a physical exam and that patient may need to come to the clinic to complete the assessment.  Patient agreed verbally to to understanding the risks of benefits of telemedia as explained.\par \par Patient with history of PMR, currently on steroid taper. Patient currently on prednisone 3 mg qday.  Has felt some increase in pain in the shoulders and legs as tapered.  Tapered from 7.5 to 5 to 4 to 3 mg per day.  Left shoulder pain of the past couple of days.\par No headache, no jaw pain.\par Otherwise feeling well. \par \par March 19, 2021\par Patient returns for follow up\par History of PMR on steroid taper, last visit via telehealth 12/2020\par Feels some increase in wrists and shoulders No lower extremity involvement\par On prednisone 1 mg qday, third week of 1 mg qday\par No headaches, no jaw pain\par No fever or weight loss\par No other medications, takes critical daily\par Exercises lifting weight\par bikes to work everyday\par Felt some more pronounced heart beats with higher doses of prednisone\par +morning stiffness, improves as day progresses\par No NSAIDs or acetaminophen

## 2021-03-19 NOTE — REVIEW OF SYSTEMS
[Arthralgias] : arthralgias [Joint Pain] : joint pain [Joint Stiffness] : joint stiffness [Negative] : Heme/Lymph [Joint Swelling] : no joint swelling

## 2021-03-19 NOTE — ASSESSMENT
[FreeTextEntry1] : 60 year old man with gradual onset first proximal lower extremity pain /muscle ache and lately upper extremity/shoulder symptoms with hours of AM stiffness, mildly elevated inflammatory markers ESR, CRP and , diagnosed with PMR and started prednisone 20 mg 06/5/20 with great clinical response, currently on prednisone 1 mg qday as continues to taper.  Patient with increasing shoulder stiffness and pain in addition to pain in the hands and wrist.  Patient would like to hold off on increasing prednisone dose at this time and will continue current dose of 1 mg qday.  Will check labs today including RF and anti CCP given distribution of joints involved at this time.  will also check xrays of the hands today as well.  Patient herve [facility] on Monday to review results, further prednisone management pending results.

## 2021-03-19 NOTE — PHYSICAL EXAM
[General Appearance - Alert] : alert [General Appearance - In No Acute Distress] : in no acute distress [General Appearance - Well-Appearing] : healthy appearing [Sclera] : the sclera and conjunctiva were normal [] : no respiratory distress [Respiration, Rhythm And Depth] : normal respiratory rhythm and effort [Exaggerated Use Of Accessory Muscles For Inspiration] : no accessory muscle use [Abnormal Walk] : normal gait [Oriented To Time, Place, And Person] : oriented to person, place, and time [Impaired Insight] : insight and judgment were intact [Affect] : the affect was normal [FreeTextEntry1] : nail changes fingers, some dry skin around the nailbeds

## 2021-03-20 LAB
25(OH)D3 SERPL-MCNC: 64.2 NG/ML
ALBUMIN SERPL ELPH-MCNC: 4.5 G/DL
ALP BLD-CCNC: 71 U/L
ALT SERPL-CCNC: 20 U/L
ANION GAP SERPL CALC-SCNC: 12 MMOL/L
AST SERPL-CCNC: 22 U/L
BASOPHILS # BLD AUTO: 0.06 K/UL
BASOPHILS NFR BLD AUTO: 0.9 %
BILIRUB SERPL-MCNC: 0.4 MG/DL
BUN SERPL-MCNC: 19 MG/DL
CALCIUM SERPL-MCNC: 10.1 MG/DL
CHLORIDE SERPL-SCNC: 104 MMOL/L
CO2 SERPL-SCNC: 24 MMOL/L
CREAT SERPL-MCNC: 1.04 MG/DL
CRP SERPL-MCNC: <3 MG/L
EOSINOPHIL # BLD AUTO: 0.17 K/UL
EOSINOPHIL NFR BLD AUTO: 2.4 %
ERYTHROCYTE [SEDIMENTATION RATE] IN BLOOD BY WESTERGREN METHOD: 7 MM/HR
GLUCOSE SERPL-MCNC: 102 MG/DL
HCT VFR BLD CALC: 45.3 %
HGB BLD-MCNC: 15.4 G/DL
IMM GRANULOCYTES NFR BLD AUTO: 0.9 %
LYMPHOCYTES # BLD AUTO: 1.86 K/UL
LYMPHOCYTES NFR BLD AUTO: 26.8 %
MAN DIFF?: NORMAL
MCHC RBC-ENTMCNC: 29.1 PG
MCHC RBC-ENTMCNC: 34 GM/DL
MCV RBC AUTO: 85.6 FL
MONOCYTES # BLD AUTO: 0.62 K/UL
MONOCYTES NFR BLD AUTO: 8.9 %
NEUTROPHILS # BLD AUTO: 4.17 K/UL
NEUTROPHILS NFR BLD AUTO: 60.1 %
PLATELET # BLD AUTO: 186 K/UL
POTASSIUM SERPL-SCNC: 4.4 MMOL/L
PROT SERPL-MCNC: 7.2 G/DL
RBC # BLD: 5.29 M/UL
RBC # FLD: 13 %
RHEUMATOID FACT SER QL: <10 IU/ML
SODIUM SERPL-SCNC: 140 MMOL/L
WBC # FLD AUTO: 6.94 K/UL

## 2021-03-23 LAB
CCP AB SER IA-ACNC: <8 UNITS
RF+CCP IGG SER-IMP: NEGATIVE

## 2021-03-29 ENCOUNTER — NON-APPOINTMENT (OUTPATIENT)
Age: 61
End: 2021-03-29

## 2021-08-16 ENCOUNTER — APPOINTMENT (OUTPATIENT)
Dept: INTERNAL MEDICINE | Facility: CLINIC | Age: 61
End: 2021-08-16
Payer: COMMERCIAL

## 2021-08-16 DIAGNOSIS — Z12.11 ENCOUNTER FOR SCREENING FOR MALIGNANT NEOPLASM OF COLON: ICD-10-CM

## 2021-08-16 DIAGNOSIS — R73.03 PREDIABETES.: ICD-10-CM

## 2021-08-16 DIAGNOSIS — E55.9 VITAMIN D DEFICIENCY, UNSPECIFIED: ICD-10-CM

## 2021-08-16 DIAGNOSIS — M25.519 PAIN IN UNSPECIFIED SHOULDER: ICD-10-CM

## 2021-08-16 DIAGNOSIS — F17.290 NICOTINE DEPENDENCE, OTHER TOBACCO PRODUCT, UNCOMPLICATED: ICD-10-CM

## 2021-08-16 PROCEDURE — 99442: CPT

## 2021-08-16 RX ORDER — PREDNISONE 5 MG/1
5 TABLET ORAL DAILY
Qty: 30 | Refills: 1 | Status: COMPLETED | COMMUNITY
Start: 2020-06-05 | End: 2021-08-16

## 2021-08-16 RX ORDER — CALCIUM CITRATE 200(950)MG
950 (200 CA) TABLET ORAL
Refills: 0 | Status: ACTIVE | COMMUNITY

## 2021-08-16 RX ORDER — PREDNISONE 1 MG/1
1 TABLET ORAL
Qty: 120 | Refills: 2 | Status: COMPLETED | COMMUNITY
Start: 2020-09-11 | End: 2021-08-16

## 2021-08-16 RX ORDER — ERGOCALCIFEROL 1.25 MG/1
1.25 MG CAPSULE, LIQUID FILLED ORAL
Qty: 4 | Refills: 3 | Status: COMPLETED | COMMUNITY
Start: 2020-07-31 | End: 2021-08-16

## 2021-08-16 RX ORDER — CHOLECALCIFEROL (VITAMIN D3) 1250 MCG
1.25 MG CAPSULE ORAL
Qty: 12 | Refills: 0 | Status: COMPLETED | COMMUNITY
Start: 2020-05-26 | End: 2021-08-16

## 2021-09-03 ENCOUNTER — RESULT REVIEW (OUTPATIENT)
Age: 61
End: 2021-09-03

## 2021-09-03 ENCOUNTER — LABORATORY RESULT (OUTPATIENT)
Age: 61
End: 2021-09-03

## 2021-09-03 ENCOUNTER — APPOINTMENT (OUTPATIENT)
Dept: RHEUMATOLOGY | Facility: CLINIC | Age: 61
End: 2021-09-03
Payer: COMMERCIAL

## 2021-09-03 ENCOUNTER — OUTPATIENT (OUTPATIENT)
Dept: OUTPATIENT SERVICES | Facility: HOSPITAL | Age: 61
LOS: 1 days | End: 2021-09-03
Payer: COMMERCIAL

## 2021-09-03 VITALS
SYSTOLIC BLOOD PRESSURE: 151 MMHG | HEIGHT: 62 IN | DIASTOLIC BLOOD PRESSURE: 75 MMHG | OXYGEN SATURATION: 97 % | TEMPERATURE: 98.1 F | WEIGHT: 181 LBS | HEART RATE: 64 BPM | BODY MASS INDEX: 33.31 KG/M2

## 2021-09-03 DIAGNOSIS — R10.9 UNSPECIFIED ABDOMINAL PAIN: ICD-10-CM

## 2021-09-03 DIAGNOSIS — M85.80 OTHER SPECIFIED DISORDERS OF BONE DENSITY AND STRUCTURE, UNSPECIFIED SITE: ICD-10-CM

## 2021-09-03 DIAGNOSIS — M35.3 POLYMYALGIA RHEUMATICA: ICD-10-CM

## 2021-09-03 DIAGNOSIS — R79.89 OTHER SPECIFIED ABNORMAL FINDINGS OF BLOOD CHEMISTRY: ICD-10-CM

## 2021-09-03 DIAGNOSIS — M25.512 PAIN IN LEFT SHOULDER: ICD-10-CM

## 2021-09-03 PROCEDURE — 36415 COLL VENOUS BLD VENIPUNCTURE: CPT

## 2021-09-03 PROCEDURE — 99214 OFFICE O/P EST MOD 30 MIN: CPT | Mod: 25

## 2021-09-03 PROCEDURE — 73030 X-RAY EXAM OF SHOULDER: CPT | Mod: 26,LT

## 2021-09-03 NOTE — PHYSICAL EXAM
[General Appearance - Alert] : alert [General Appearance - In No Acute Distress] : in no acute distress [General Appearance - Well Nourished] : well nourished [General Appearance - Well Developed] : well developed [Sclera] : the sclera and conjunctiva were normal [Respiration, Rhythm And Depth] : normal respiratory rhythm and effort [Auscultation Breath Sounds / Voice Sounds] : lungs were clear to auscultation bilaterally [Heart Rate And Rhythm] : heart rate was normal and rhythm regular [Heart Sounds] : normal S1 and S2 [Murmurs] : no murmurs [Edema] : there was no peripheral edema [Heart Sounds Pericardial Friction Rub] : no pericardial rub [Veins - Varicosity Changes] : there were no varicosital changes [Abdomen Tenderness] : non-tender [Cervical Lymph Nodes Enlarged Posterior Bilaterally] : posterior cervical [Cervical Lymph Nodes Enlarged Anterior Bilaterally] : anterior cervical [Abnormal Walk] : normal gait [Nail Clubbing] : no clubbing  or cyanosis of the fingernails [Musculoskeletal - Swelling] : no joint swelling seen [Motor Tone] : muscle strength and tone were normal [] : no rash [Skin Lesions] : no skin lesions [Oriented To Time, Place, And Person] : oriented to person, place, and time [Impaired Insight] : insight and judgment were intact [Neck Appearance] : the appearance of the neck was normal [Exaggerated Use Of Accessory Muscles For Inspiration] : no accessory muscle use [FreeTextEntry1] : left Shoulder FROM, no tenderenss or synovitis.

## 2021-09-03 NOTE — HISTORY OF PRESENT ILLNESS
[Weight Loss] : weight loss [___ Week(s) Ago] : [unfilled] week(s) ago [FreeTextEntry1] : Follow up: 9/3/21 \par \par Last time seen by Dr. Chambers \par 59 y/o M follows up for the  the diagnoses of PMR, started  prednisone 20mg po daily  06/5/20  20mg with excellent clinical  response, gradually tapered and off since march, 2021, no recurrence since then. \par He also has osteopenia on DEXA done on 06/2020 for which takes calcium and Vit D 2000IU daily. \par Has AC joint OA- reports few months ago left shoulder pain got worse, does heavy lifting 250Ibs weights , symptoms improved but he wants to make sure no serious injury. \par Left sided flank pain for some time. \par \par \par \par Follow up: 9/11/20 \par 58 y/o M with PMR \par started  prednisone on 06/5/20  20mg with excellent clinical  response. All symptoms resolved. He is back to baseline. \par prednisone tapered down and taking  7.5mg for past 10 days\par Regained 4Ibs \par Osteopenia: takes Calcium 1200mg and Vit D 50.000 weekly \par \par \par \par Follow up: 7/31/20 \par 58 y/o M with PMR \par started  prednisone on 06/5/20  20mg with excellent clinical n response. All symptoms resolved. He is back to baseline. \par prednisone tapered down and taking 12.5mg for past 2 week. \par Regained 4Ibs \par Osteopenia: takes Calcium 1200mg and Vit D 2000, low Vit D \par \par \par Follow up: 6/19/20 \par PMR: on prednisone 20mg for past 2 weeks, has excellent response. All symptoms resolved. He is back to baseline. \par Regained 4Ibs \par Labs and Xrays reviewed with pt. \par Noted th spine compression deformity \par \par \par \par Referred by  for Rheumatology consultation \par \par \par 58 y/o cigar smoker 2 times per week ( 3-4 cigars) , pre DM, Vit D deficiency. \par Since early feb reports progressive  b/l groin and hip pain, first noticed on left side and then affecting R side. Subsequently left> right shoulder pain, neck stiffness \par AM stiffness for hours, symptoms improve as day goes on.  \par lifting arms above head has been difficult, has noticed trouble taking stairs. \par Has muscle ache and cramps. \par Denies constitutional symptoms such as fever, chills or night sweats. Denies HA or jaw claudication, or transient visual loss. \par Since pandemic lost weight after changing to healthy diet. 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 \par He took tylenol and Ibuprofen for pain control, which  has helped. \par Referred to r/u PMR. \par \par No h/o  memory loss, patchy hair loss, sicca symptoms, photosensitivity, HA,  Raynaud's, oral ulcers, nasal ulcers. \par Personal or family hx of autoimmune disease, no hx of psoriasis\par  [Anorexia] : no anorexia [Malaise] : no malaise [Fever] : no fever [Chills] : no chills [Fatigue] : no fatigue [Depression] : no depression [Malar Facial Rash] : no malar facial rash [Skin Lesions] : no lesions [Skin Nodules] : no skin nodules [Oral Ulcers] : no oral ulcers [Dry Mouth] : no dry mouth [Cough] : no cough [Dysphonia] : no dysphonia [Dysphagia] : no dysphagia [Shortness of Breath] : no shortness of breath [Chest Pain] : no chest pain [Arthralgias] : no arthralgias [Joint Swelling] : no joint swelling [Joint Deformity] : no joint deformity [Decreased ROM] : no decreased range of motion [Morning Stiffness] : no morning stiffness [Falls] : no falls [Difficulty Standing] : no difficulty standing [Difficulty Walking] : no difficulty walking [Dyspnea] : no dyspnea [Myalgias] : no myalgias [Muscle Weakness] : no muscle weakness [Muscle Spasms] : no muscle spasms [Muscle Cramping] : no muscle cramping [Visual Changes] : no visual changes [Eye Pain] : no eye pain [Eye Redness] : no eye redness [Dry Eyes] : no dry eyes

## 2021-09-03 NOTE — REVIEW OF SYSTEMS
[Recent Weight Loss (___ Lbs)] : recent [unfilled] ~Ulb weight loss [Muscle Weakness] : muscle weakness [Fever] : no fever [Chills] : no chills [Feeling Poorly] : not feeling poorly [Feeling Tired] : not feeling tired [Eyesight Problems] : no eyesight problems [Dry Eyes] : no dryness of the eyes [Heart Rate Is Slow] : the heart rate was not slow [Chest Pain] : no chest pain [Palpitations] : no palpitations [Cough] : no cough [SOB on Exertion] : no shortness of breath during exertion [Constipation] : no constipation [Diarrhea] : no diarrhea [Anxiety] : no anxiety [Depression] : no depression [Easy Bleeding] : no tendency for easy bleeding [Easy Bruising] : no tendency for easy bruising [Swollen Glands] : no swollen glands [Swollen Glands In The Neck] : no swollen glands in the neck

## 2021-09-03 NOTE — ASSESSMENT
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 Productive changes in the spine with mid thoracic compression deformity of indeterminate age. \par Cam morphology of the femora. \par Osteopenia \par \par \par 1. PMR: clinically in remission, educated pt that possibility of recurrence exist. \par Check labs- done in office today \par \par 2. left  shoulder  pain: does heavy lifting, previous Xray AC joint arthritis, recommended to avoid heavy lifting and repeat Xray.- may consider Ortho, MRI, but functionally he is doing well, normal ROM \par \par 3. left flank pain: check UA to rule out UTI and abdominal US to see if any structural lesions/ nephrolithiasis \par \par 4.  osteopenia,  Thoracic compression deformity without fracture- need repeat DEXA in June, 2021 \par c/w Calcium 1200 mg daily from diet and supplements (to be taken in divided doses as no more than 500-600 mg can be absorbed at one time) and Vitamin D 2000IU can be reduced to 1000IU as has Vit D level >60 \par Diet, exercise and fall prevention discussed. \par \par \par \par f/u in 6-8 months \par \par \par  \par \par \par \par \par

## 2021-09-07 LAB
ALBUMIN SERPL ELPH-MCNC: 4.7 G/DL
ALP BLD-CCNC: 88 U/L
ALT SERPL-CCNC: 19 U/L
ANION GAP SERPL CALC-SCNC: 12 MMOL/L
APPEARANCE: CLEAR
AST SERPL-CCNC: 23 U/L
BACTERIA: NEGATIVE
BASOPHILS # BLD AUTO: 0.08 K/UL
BASOPHILS NFR BLD AUTO: 1.2 %
BILIRUB SERPL-MCNC: 0.4 MG/DL
BILIRUBIN URINE: NEGATIVE
BLOOD URINE: NEGATIVE
BUN SERPL-MCNC: 18 MG/DL
CALCIUM SERPL-MCNC: 9.8 MG/DL
CHLORIDE SERPL-SCNC: 103 MMOL/L
CO2 SERPL-SCNC: 25 MMOL/L
COLOR: YELLOW
CREAT SERPL-MCNC: 1.22 MG/DL
CRP SERPL-MCNC: 4 MG/L
EOSINOPHIL # BLD AUTO: 0.21 K/UL
EOSINOPHIL NFR BLD AUTO: 3.2 %
ERYTHROCYTE [SEDIMENTATION RATE] IN BLOOD BY WESTERGREN METHOD: 10 MM/HR
GLUCOSE QUALITATIVE U: NEGATIVE
GLUCOSE SERPL-MCNC: 105 MG/DL
HCT VFR BLD CALC: 47.2 %
HGB BLD-MCNC: 15.6 G/DL
HYALINE CASTS: 0 /LPF
IMM GRANULOCYTES NFR BLD AUTO: 1.1 %
KETONES URINE: NEGATIVE
LEUKOCYTE ESTERASE URINE: NEGATIVE
LYMPHOCYTES # BLD AUTO: 1.74 K/UL
LYMPHOCYTES NFR BLD AUTO: 26.6 %
MAN DIFF?: NORMAL
MCHC RBC-ENTMCNC: 28.9 PG
MCHC RBC-ENTMCNC: 33.1 GM/DL
MCV RBC AUTO: 87.4 FL
MICROSCOPIC-UA: NORMAL
MONOCYTES # BLD AUTO: 0.63 K/UL
MONOCYTES NFR BLD AUTO: 9.6 %
NEUTROPHILS # BLD AUTO: 3.8 K/UL
NEUTROPHILS NFR BLD AUTO: 58.3 %
NITRITE URINE: NEGATIVE
PH URINE: 6
PLATELET # BLD AUTO: 201 K/UL
POTASSIUM SERPL-SCNC: 4.6 MMOL/L
PROT SERPL-MCNC: 7.5 G/DL
PROTEIN URINE: NORMAL
RBC # BLD: 5.4 M/UL
RBC # FLD: 13 %
RED BLOOD CELLS URINE: 1 /HPF
SODIUM SERPL-SCNC: 140 MMOL/L
SPECIFIC GRAVITY URINE: 1.02
SQUAMOUS EPITHELIAL CELLS: 0 /HPF
UROBILINOGEN URINE: NORMAL
WBC # FLD AUTO: 6.53 K/UL
WHITE BLOOD CELLS URINE: 0 /HPF

## 2021-10-29 ENCOUNTER — OUTPATIENT (OUTPATIENT)
Dept: OUTPATIENT SERVICES | Facility: HOSPITAL | Age: 61
LOS: 1 days | End: 2021-10-29
Payer: COMMERCIAL

## 2021-10-29 ENCOUNTER — APPOINTMENT (OUTPATIENT)
Dept: ULTRASOUND IMAGING | Facility: HOSPITAL | Age: 61
End: 2021-10-29
Payer: COMMERCIAL

## 2021-10-29 PROCEDURE — 76700 US EXAM ABDOM COMPLETE: CPT

## 2021-10-29 PROCEDURE — 76700 US EXAM ABDOM COMPLETE: CPT | Mod: 26

## 2021-11-01 DIAGNOSIS — K76.0 FATTY (CHANGE OF) LIVER, NOT ELSEWHERE CLASSIFIED: ICD-10-CM

## 2021-11-01 DIAGNOSIS — K80.20 CALCULUS OF GALLBLADDER W/OUT CHOLECYSTITIS W/OUT OBSTRUCTION: ICD-10-CM
